# Patient Record
Sex: MALE | Race: WHITE | URBAN - METROPOLITAN AREA
[De-identification: names, ages, dates, MRNs, and addresses within clinical notes are randomized per-mention and may not be internally consistent; named-entity substitution may affect disease eponyms.]

---

## 2018-08-20 ENCOUNTER — OFFICE VISIT (OUTPATIENT)
Dept: NEUROPSYCHOLOGY | Facility: CLINIC | Age: 63
End: 2018-08-20
Payer: COMMERCIAL

## 2018-08-20 DIAGNOSIS — F32.5 MAJOR DEPRESSION IN COMPLETE REMISSION (H): ICD-10-CM

## 2018-08-20 DIAGNOSIS — F09 COGNITIVE DISORDER: Primary | ICD-10-CM

## 2018-08-20 NOTE — PROGRESS NOTES
Pt was seen for neuropsychological evaluation at the request of Carmen Gillsi NP for the purposes of diagnostic clarification and treatment planning. 2 hours of face-to-face testing were provided by this writer. Please see Dr. Audrey Narvaez's report for a full interpretation of the findings.    Greg Nguyen  Psychometrist

## 2018-08-20 NOTE — MR AVS SNAPSHOT
After Visit Summary   2018    Rudy Fagan    MRN: [id number]           Patient Information     Date Of Birth          1955        Visit Information        Provider Department      2018 12:30 PM Audrey Narvaez PsyD [facility]        Today's Diagnoses     Cognitive disorder    -  1    Major depression in complete remission (H)           Follow-ups after your visit        Who to contact     Please call your clinic at [phone] to:    Ask questions about your health    Make or cancel appointments    Discuss your medicines    Learn about your test results    Speak to your doctor            Additional Information About Your Visit        MyChart Information     Talkbits is an electronic gateway that provides easy, online access to your medical records. With Talkbits, you can request a clinic appointment, read your test results, renew a prescription or communicate with your care team.     To sign up for Talkbits visit the website at [email or web address]   You will be asked to enter the access code listed below, as well as some personal information. Please follow the directions to create your username and password.     Your access code is: [email or web address]  Expires: 2018  6:30 AM     Your access code will  in 90 days. If you need help or a new code, please contact your [facility] or call [phone] for assistance.        Care EveryWhere ID     This is your Care EveryWhere ID. This could be used by other organizations to access your [place] medical records  [id number]         Blood Pressure from Last 3 Encounters:   No data found for BP    Weight from Last 3 Encounters:   No data found for Wt              We Performed the Following     [id number] TESTING, PER HR/PSYCHOLOGIST     NEUROPSYCH TESTING BY [facility]        Primary Care Provider Office Phone # Fax #    Dinora Talavera [phone]       [facility] [address] [place] [address]        Equal Access to Services     DIANARICK KENNETH : Hadii aad ku hadtonyderek Somitul, waazamda jose, qatammyta antoninomajavi ridley, waxay idiin hayjodiechristen ordonezellenmelanie lester. So [facility] [phone].    ATENCIÓN: Si habla español, tiene a riojas disposición servicios gratuitos de asistencia lingüística. Llame al [phone].    We comply with applicable federal civil rights laws and [place] laws. We do not discriminate on the basis of race, color, national origin, age, disability, sex, sexual orientation, or gender identity.            Thank you!     Thank you for choosing [facility] NEUROPSYCHOLOGY  for your care. Our goal is always to provide you with excellent care. Hearing back from our patients is one way we can continue to improve our services. Please take a few minutes to complete the written survey that you may receive in the mail after your visit with us. Thank you!             Your Updated Medication List - Protect others around you: Learn how to safely use, store and throw away your medicines at www.disposemymeds.org.      Notice  As of 8/20/2018 11:59 PM    You have not been prescribed any medications.
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
## 2018-08-31 NOTE — PROGRESS NOTES
CORDERO ORIENTATION TEST WAIS-IV                                    Raw           Age Scaled    Score  100  Vocabulary  24     7     Block Design  24     7      WECHSLER MEMORY SCALES Coding  38     6       Immed.   30 Min Digit Span  17     5      Logical Memory      12/14       14/8  Comprehension  18     7      Visual Reproduction   5    5      30 Minute Recognition   4     WISCONSIN CARD SORTING TEST - 1 deck       ARSENIO AUDITORY VERBAL LEARNING TEST     # categories        1          I  II  III IV  V VI VII PSYCHOMOTOR TESTS    8   9   11   13   12   8   12       Right    Left    30 Minute Recall  12      Grooved Pegboard      75    95      30 Minute Recognition  15   Drops: 1         Drops: 0   Intrusions    0       MAKE A FIGURE TEST   LETTER CANCELLATION TEST         Score     8     Time  177   Errors     0      CONTROLLED WORD ASSOCIATION TEST   MURILLO-GESTALT (3-figure)     Score  33    Recall  2.5      TOKEN TEST   BOSTON NAMING TEST     Score  163    Score  49